# Patient Record
Sex: FEMALE | Race: WHITE | NOT HISPANIC OR LATINO | Employment: UNEMPLOYED | URBAN - METROPOLITAN AREA
[De-identification: names, ages, dates, MRNs, and addresses within clinical notes are randomized per-mention and may not be internally consistent; named-entity substitution may affect disease eponyms.]

---

## 2018-10-19 ASSESSMENT — MIFFLIN-ST. JEOR: SCORE: 1496.83

## 2018-10-22 ENCOUNTER — ANESTHESIA - HEALTHEAST (OUTPATIENT)
Dept: SURGERY | Facility: HOSPITAL | Age: 53
End: 2018-10-22

## 2018-10-22 ENCOUNTER — SURGERY - HEALTHEAST (OUTPATIENT)
Dept: SURGERY | Facility: HOSPITAL | Age: 53
End: 2018-10-22

## 2019-07-13 ENCOUNTER — OFFICE VISIT (OUTPATIENT)
Dept: URGENT CARE | Facility: URGENT CARE | Age: 54
End: 2019-07-13
Payer: COMMERCIAL

## 2019-07-13 VITALS
DIASTOLIC BLOOD PRESSURE: 79 MMHG | HEART RATE: 78 BPM | RESPIRATION RATE: 16 BRPM | BODY MASS INDEX: 32.78 KG/M2 | WEIGHT: 204 LBS | OXYGEN SATURATION: 98 % | TEMPERATURE: 97.8 F | SYSTOLIC BLOOD PRESSURE: 130 MMHG | HEIGHT: 66 IN

## 2019-07-13 DIAGNOSIS — H69.91 DYSFUNCTION OF RIGHT EUSTACHIAN TUBE: ICD-10-CM

## 2019-07-13 DIAGNOSIS — R42 DIZZINESS: Primary | ICD-10-CM

## 2019-07-13 PROCEDURE — 99214 OFFICE O/P EST MOD 30 MIN: CPT | Performed by: FAMILY MEDICINE

## 2019-07-13 RX ORDER — VORTIOXETINE 10 MG/1
TABLET, FILM COATED ORAL
Refills: 0 | COMMUNITY
Start: 2019-07-12

## 2019-07-13 RX ORDER — VENLAFAXINE HYDROCHLORIDE 75 MG/1
CAPSULE, EXTENDED RELEASE ORAL
COMMUNITY
Start: 2019-04-11

## 2019-07-13 ASSESSMENT — MIFFLIN-ST. JEOR: SCORE: 1543.12

## 2019-07-13 NOTE — PROGRESS NOTES
"Chief complaint: dizziness     For the past 2 months at 3pm will have nausea will have to lay down and drink meg feel awful.   Patient thinks it may be from her contact lens or glasses because it's usually when she's sewing in the afternoon    However in the last couple of weeks has had crackling in the ears, dry ears.     Has switched to glasses and seemed to help  However still get nauseous but not as bad    This morning went to wash her hair in the shower and felt dizzy that she needed to kneel down lasted a minute or two.   Chest pain or palpitation: No  Syncope or presyncope: No  Motor,sensory weakness, or slurring of speech: No  Headache: No  Blurring of vision : No   Abdominal pain: No  Recent trauma: No    Tried supportive treatment  At home no relief  Additional Information: above.     Problem list and histories reviewed & adjusted, as indicated.  Additional history: as documented    Problem list, Medication list, Allergies, and Medical/Social/Surgical histories reviewed in EPIC and updated as appropriate.    ROS:  Constitutional, HEENT, cardiovascular, pulmonary, gi and gu systems are negative, except as otherwise noted.    OBJECTIVE:                                                    /79   Pulse 78   Temp 97.8  F (36.6  C) (Oral)   Resp 16   Ht 1.67 m (5' 5.75\")   Wt 92.5 kg (204 lb)   SpO2 98%   BMI 33.18 kg/m    Body mass index is 33.18 kg/m .  GENERAL: healthy, alert and no distress  EYES: Eyes grossly normal to inspection, PERRL and conjunctivae and sclerae normal  HENT: ear canals and TM's normal, nose and mouth without ulcers or lesions  NECK: no adenopathy, no asymmetry, masses, or scars and thyroid normal to palpation  RESP: lungs clear to auscultation - no rales, rhonchi or wheezes  CV: regular rate and rhythm, normal S1 S2, no S3 or S4, no murmur, click or rub, no peripheral edema and peripheral pulses strong  ABDOMEN: soft, nontender, no hepatosplenomegaly, no masses and bowel " sounds normal  MS: no gross musculoskeletal defects noted, no edema  SKIN: no suspicious lesions or rashes  NEURO: Normal strength and tone, mentation intact and speech normal  PSYCH: mentation appears normal, affect normal/bright    Diagnostic Test Results:  none      ASSESSMENT/PLAN:                                                        ICD-10-CM    1. Dizziness R42    2. Dysfunction of right eustachian tube H69.81        Dizziness is likely multifactorial  2 month dizziness likely related to her eyewear  This morning possible BPPV  Ear and sinus exam normal   Patient has allergic rhinitis seasonal  Trial zyrtec and flonase     See patient instructions.  Signs and symptoms of worsening dizziness discussed. Alarm symptoms of possible CVA or cardiac events discussed. If with any of these advised to go to ER.    No driving and avoid being on any unprotected heights until dizziness is resolved.    Recommend follow up with primary care provider if no relief, sooner if worse  Adverse reactions of medications discussed.  Over the counter medications discussed.   Aware to come back in if with worsening symptoms or if no relief despite treatment plan  Patient voiced understanding and had no further questions.     MD Karla Joyner MD  [facility]

## 2019-07-13 NOTE — PATIENT INSTRUCTIONS
Patient Education     Dizziness (Vertigo) and Balance Problems: Staying Safe     Replace burned-out light bulbs to keep your home safe and well lit.     Falls or accidents can lead to pain, broken bones, and fear of future falls. Protect yourself and others by preparing for episodes. Simple steps can help you stay safe at home and wherever you go.  Lighting  Keep all areas well lit. This helps your eyes send the right signals to the brain. It also makes you less likely to trip and fall. If bright lights make symptoms worse, dim the lights or lie in a dark room until the dizziness passes. Then turn the lights back to their normal level.  Tips:    Keep a flashlight by the bed.    Place nightlights in bathrooms and hallways.    Replace burned-out bulbs, or have someone replace them for you.  Preventing falls  To reduce your risk of falling:    Get out of bed or up from a chair slowly.    Wear low-heeled shoes that fit properly and have slip-resistant soles.    Remove throw rugs. Clear clutter from walkways.    Use handrails on stairs. Have handrails installed or adjusted if needed.    Install grab bars in the bathroom. Don't use towel racks for balance.    Use a shower stool. Also put adhesive strips in the shower or on the tub floor.  Going out  With a little time and preparation, you can get around safely.  Tips:    Bring a cane or walking aid if needed.    Give yourself plenty of time in case you start to get dizzy.    Ask your healthcare provider what type of exercise is safe for your condition.    Be patient. If an activity such as walking through a crowded shop causes you stress, you may not be ready for it yet.  Driving  If you become dizzy or disoriented while driving, you could hurt yourself and others. That's why it's best to not drive until symptoms have gone away. In some cases, your license may be temporarily held until it's safe for you to drive again.  For safety:    Ask a friend to drive for  you.    Take public transportation.    Walk to stores and other places when you can.  Asking for help  Don't be afraid to ask for help running errands, cooking meals, and doing exercise. Whether it's a friend, loved one, neighbor, or stranger on the street, a little help can make a world of difference.   Date Last Reviewed: 11/1/2016 2000-2018 [employer]. [address] [place] [address], [place] [address]. All rights reserved. This information is not intended as a substitute for professional medical care. Always follow your healthcare professional's instructions.           Patient Education     Earache, No Infection (Adult)  Earaches can happen without an infection. This occurs when air and fluid build up behind the eardrum causing a feeling of fullness and discomfort and reduced hearing. This is called otitis media with effusion (OME) or serous otitis media. It means there is fluid in the middle ear. It is not the same as acute otitis media, which is typically from infection.  OME can happen when you have a cold if congestion blocks the passage that drains the middle ear. This passage is called the eustachian tube. OME may also occur with nasal allergies or after a bacterial middle ear infection.    The pain or discomfort may come and go. You may hear clicking or popping sounds when you chew or swallow. You may feel that your balance is off. Or you may hear ringing in the ear.  It often takes from several weeks up to 3 months for the fluid to clear on its own. Oral pain relievers and ear drops help if there is pain. Decongestants and antihistamines sometimes help. Antibiotics don't help since there is no infection. Your doctor may prescribe a nasal spray to help reduce swelling in the nose and eustachian tube. This can allow the ear to drain.  If your OME doesn't improve after 3 months, surgery may be used to drain the fluid and insert a small tube in the eardrum to allow continued drainage.  Because the  middle ear fluid can become infected, it is important to watch for signs of an ear infection which may develop later. These signs include increased ear pain, fever, or drainage from the ear.  Home care  The following guidelines will help you care for yourself at home:    You may use over-the-counter medicine as directed to control pain, unless another medicine was prescribed. If you have chronic liver or kidney disease or ever had a stomach ulcer or GI bleeding, talk with your doctor before using these medicines. Aspirin should never be used in anyone under 18 years of age who is ill with a fever. It may cause severe liver damage.    You may use over-the-counter decongestants such as phenylephrine or pseudoephedrine. But they are not always helpful. Don't use nasal spray decongestants more than 3 days. Longer use can make congestion worse. Prescription nasal sprays from your doctor don't typically have those restrictions.    Antihistamines may help if you are also having allergy symptoms.    You may use medicines such as guaifenesin to thin mucus and promote drainage.  Follow-up care  Follow up with your healthcare provider or as advised if you are not feeling better after 3 days.  When to seek medical advice  Call your healthcare provider right away if any of the following occur:    Your ear pain gets worse or does not start to improve     Fever of 100.4 F (38 C) or higher, or as directed by your healthcare provider    Fluid or blood draining from the ear    Headache or sinus pain    Stiff neck    Unusual drowsiness or confusion  Date Last Reviewed: 10/1/2016    5936-2513 [employer]. [address] [place] [address], [place] [address]. All rights reserved. This information is not intended as a substitute for professional medical care. Always follow your healthcare professional's instructions.

## 2019-08-17 ENCOUNTER — OFFICE VISIT (OUTPATIENT)
Dept: URGENT CARE | Facility: URGENT CARE | Age: 54
End: 2019-08-17
Payer: COMMERCIAL

## 2019-08-17 VITALS
TEMPERATURE: 97.8 F | WEIGHT: 201 LBS | SYSTOLIC BLOOD PRESSURE: 122 MMHG | BODY MASS INDEX: 32.69 KG/M2 | DIASTOLIC BLOOD PRESSURE: 79 MMHG | RESPIRATION RATE: 16 BRPM | OXYGEN SATURATION: 98 % | HEART RATE: 68 BPM

## 2019-08-17 DIAGNOSIS — J03.90 TONSILLITIS: Primary | ICD-10-CM

## 2019-08-17 DIAGNOSIS — Z20.818 EXPOSURE TO STREP THROAT: ICD-10-CM

## 2019-08-17 DIAGNOSIS — R07.0 THROAT PAIN: ICD-10-CM

## 2019-08-17 LAB
DEPRECATED S PYO AG THROAT QL EIA: NORMAL
SPECIMEN SOURCE: NORMAL

## 2019-08-17 PROCEDURE — 87880 STREP A ASSAY W/OPTIC: CPT | Performed by: PHYSICIAN ASSISTANT

## 2019-08-17 PROCEDURE — 99213 OFFICE O/P EST LOW 20 MIN: CPT | Performed by: PHYSICIAN ASSISTANT

## 2019-08-17 PROCEDURE — 87081 CULTURE SCREEN ONLY: CPT | Performed by: PHYSICIAN ASSISTANT

## 2019-08-17 RX ORDER — AMOXICILLIN 500 MG/1
500 CAPSULE ORAL 2 TIMES DAILY
Qty: 20 CAPSULE | Refills: 0 | Status: SHIPPED | OUTPATIENT
Start: 2019-08-17 | End: 2019-08-27

## 2019-08-17 ASSESSMENT — ENCOUNTER SYMPTOMS
COUGH: 1
FATIGUE: 0
CONSTITUTIONAL NEGATIVE: 1
FEVER: 0
CARDIOVASCULAR NEGATIVE: 1
CHILLS: 0
PALPITATIONS: 0
GASTROINTESTINAL NEGATIVE: 1
WHEEZING: 0
RHINORRHEA: 1
SORE THROAT: 1
SHORTNESS OF BREATH: 0
SINUS PRESSURE: 1

## 2019-08-17 NOTE — LETTER
August 18, 2019      Sharon L Behnke  [address] [facility] [address]        Dear Ms.Behnke,    We are writing to inform you of your test results.    Your test results fall within the expected range(s) or remain unchanged from previous results.  Please continue with current treatment plan.    Resulted Orders   Strep, Rapid Screen   Result Value Ref Range    Specimen Description Throat     Rapid Strep A Screen       NEGATIVE: No Group A streptococcal antigen detected by immunoassay, await culture report.   Beta strep group A culture   Result Value Ref Range    Specimen Description Throat     Culture Micro No beta hemolytic Streptococcus Group A isolated        If you have any questions or concerns, please call the clinic at the number listed above.       Sincerely,        Philly See NAHUM Khalil/HS

## 2019-08-17 NOTE — PROGRESS NOTES
"Subjective   Sharon L Behnke is a 54 year old female who presents to clinic today for the following health issues:  HPI   RESPIRATORY SYMPTOMS    Duration: 4-5days    Description  nasal congestion, rhinorrhea, sore throat and cough    Severity: mild    Accompanying signs and symptoms: Dry cough, shortness of breath or wheezing.  Mild sinus congestion/pressure but no pain or HA. No abdominal pain, n/v, constipation, diarrhea, bloody or black tarry stools.  No fever, chills or sweats.     History (predisposing factors):  Strep exposure at home.  No pmh of asthma.  Non-smoker.    Precipitating or alleviating factors: None    Therapies tried and outcome:  rest and fluids guaifenesin, lozenges with minimal relief      There is no problem list on file for this patient.    No past surgical history on file.    Social History     Tobacco Use     Smoking status: Never Smoker     Smokeless tobacco: Never Used     Tobacco comment: no 2nd hand smoke exposure   Substance Use Topics     Alcohol use: Yes     Comment: monthly     No family history on file.      Current Outpatient Medications   Medication Sig Dispense Refill     aspirin 81 MG tablet Take 81 mg by mouth daily       Cholecalciferol (VITAMIN D PO) Take  by mouth.       CLONIDINE HCL PO Take  by mouth.       CRESTOR 10 MG tablet        CYCLOBENZAPRINE HCL PO Take  by mouth.       ibuprofen (ADVIL,MOTRIN) 200 MG tablet Take 200 mg by mouth every 4 hours as needed.       Linaclotide (LINZESS PO) Take by mouth every morning (before breakfast)       TRINTELLIX 10 MG tablet   0     venlafaxine (EFFEXOR-XR) 75 MG 24 hr capsule        Allergies   Allergen Reactions     Codeine      Gets \"wired\"     Reviewed and updated as needed this visit by Provider       Review of Systems   Constitutional: Negative.  Negative for chills, fatigue and fever.   HENT: Positive for congestion, rhinorrhea, sinus pressure and sore throat. Negative for ear discharge, ear pain and hearing loss.  "   Respiratory: Positive for cough. Negative for shortness of breath and wheezing.    Cardiovascular: Negative.  Negative for chest pain, palpitations and peripheral edema.   Gastrointestinal: Negative.    Allergic/Immunologic: Negative for environmental allergies.   All other systems reviewed and are negative.           Objective    /79   Pulse 68   Temp 97.8  F (36.6  C) (Oral)   Resp 16   Wt 91.2 kg (201 lb)   SpO2 98%   BMI 32.69 kg/m    Body mass index is 32.69 kg/m .  Physical Exam   Constitutional: She is oriented to person, place, and time. She appears well-developed and well-nourished. No distress.   HENT:   Head: Normocephalic and atraumatic.   Nose: Nose normal.   Mouth/Throat: Uvula is midline and mucous membranes are normal. Posterior oropharyngeal erythema present. No oropharyngeal exudate, posterior oropharyngeal edema or tonsillar abscesses. Tonsils are 2+ on the right. Tonsils are 2+ on the left. No tonsillar exudate.   TMs are intact without any erythema or bulging bilaterally.  Airway is patent.   Eyes: Pupils are equal, round, and reactive to light. Conjunctivae and EOM are normal. No scleral icterus.   Neck: Normal range of motion. Neck supple.   Cardiovascular: Normal rate, regular rhythm, normal heart sounds and intact distal pulses. Exam reveals no gallop.   No murmur heard.  Pulmonary/Chest: Effort normal and breath sounds normal. No accessory muscle usage. No respiratory distress. She has no decreased breath sounds. She has no wheezes. She has no rhonchi. She has no rales. She exhibits no tenderness.   Lymphadenopathy:        Head (right side): Tonsillar adenopathy present.        Head (left side): Tonsillar adenopathy present.     She has no cervical adenopathy.   Neurological: She is alert and oriented to person, place, and time.   Skin: Skin is warm and dry.   Psychiatric: She has a normal mood and affect. Her behavior is normal. Judgment and thought content normal.   Nursing  note and vitals reviewed.  Diagnostic Test Results:  Labs reviewed in Epic  Results for orders placed or performed in visit on 08/17/19 (from the past 24 hour(s))   Strep, Rapid Screen   Result Value Ref Range    Specimen Description Throat     Rapid Strep A Screen       NEGATIVE: No Group A streptococcal antigen detected by immunoassay, await culture report.           Assessment & Plan   Tonsillitis:  RST is negative, will send for throat culture.  Will treat for tonsillitis with [id number] based on H&P.  Recommend tylenol/ibuprofen prn pain/fever, warm salt water gargles, lozenges or cough drops.  Recheck in clinic if symptoms worsen or if symptoms do not improve.    -     amoxicillin (AMOXIL) 500 MG capsule; Take 1 capsule (500 mg) by mouth 2 times daily for 10 days    Throat pain  -     Strep, Rapid Screen  -     Beta strep group A culture    Exposure to strep throat           Philly See NAHUM Khalil  [facility]

## 2019-08-18 LAB
BACTERIA SPEC CULT: NORMAL
SPECIMEN SOURCE: NORMAL

## 2019-11-12 ENCOUNTER — HOSPITAL ENCOUNTER (OUTPATIENT)
Dept: CT IMAGING | Facility: HOSPITAL | Age: 54
Discharge: HOME | End: 2019-11-12
Attending: FAMILY MEDICINE

## 2019-11-12 ENCOUNTER — RECORDS - HEALTHEAST (OUTPATIENT)
Dept: ADMINISTRATIVE | Facility: OTHER | Age: 54
End: 2019-11-12

## 2019-11-12 DIAGNOSIS — R10.32 LLQ PAIN: ICD-10-CM

## 2019-11-12 DIAGNOSIS — K92.1 MELENA: ICD-10-CM

## 2019-11-12 DIAGNOSIS — R11.10 VOMITING: ICD-10-CM

## 2020-01-03 ENCOUNTER — ANCILLARY PROCEDURE (OUTPATIENT)
Dept: GENERAL RADIOLOGY | Facility: CLINIC | Age: 55
End: 2020-01-03
Attending: FAMILY MEDICINE
Payer: COMMERCIAL

## 2020-01-03 ENCOUNTER — OFFICE VISIT (OUTPATIENT)
Dept: ORTHOPEDICS | Facility: CLINIC | Age: 55
End: 2020-01-03
Payer: COMMERCIAL

## 2020-01-03 VITALS
HEIGHT: 66 IN | DIASTOLIC BLOOD PRESSURE: 69 MMHG | BODY MASS INDEX: 30.7 KG/M2 | WEIGHT: 191 LBS | SYSTOLIC BLOOD PRESSURE: 105 MMHG

## 2020-01-03 DIAGNOSIS — M25.572 LEFT ANKLE PAIN: ICD-10-CM

## 2020-01-03 DIAGNOSIS — M25.572 ACUTE LEFT ANKLE PAIN: Primary | ICD-10-CM

## 2020-01-03 PROCEDURE — 99204 OFFICE O/P NEW MOD 45 MIN: CPT | Performed by: FAMILY MEDICINE

## 2020-01-03 PROCEDURE — 73610 X-RAY EXAM OF ANKLE: CPT | Mod: LT

## 2020-01-03 ASSESSMENT — MIFFLIN-ST. JEOR: SCORE: 1484.15

## 2020-01-03 NOTE — PROGRESS NOTES
"Sharon L Behnke  :  1965  DOS: 1/3/2020  MRN: [id number]    Sports Medicine Clinic Visit    PCP: [facility]    Sharon L Behnke is a 54 year old female who is seen as an AIC patient presenting with left ankle pain    Injury: 1 week(s), no known DM.  Pain located over left lateral to anterior ankle pain, joint line, radiating to toes.  Additional Features:  Positive: instability, clicking,  numbness and tingling.  Symptoms are better with Ibuprofen.  Symptoms are worse with: walking, twisting, WB, wearing her high heel boots.  Other evaluation and/or treatments so far consists of: Tylenol, ace wrap, supportive shoes and taping.  Prior imaging: No recent imaging completed.  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    Past Medical History:   Diagnosis Date     Breast cancer (H)      Past Surgical History:   Procedure Laterality Date     LUMPECTOMY BREAST       MASTECTOMY       ORTHOPEDIC SURGERY Right     Left wrist surgery - plate and screws     Family History   Problem Relation Age of Onset     Bone Cancer Father        Objective  /69 (BP Location: Left arm, Patient Position: Chair, Cuff Size: Adult Regular)   Ht 1.67 m (5' 5.75\")   Wt 86.6 kg (191 lb)   BMI 31.06 kg/m        General: healthy, alert and in no distress      HEENT: no scleral icterus or conjunctival erythema     Skin: no suspicious lesions or rash. No jaundice.     CV: regular rhythm by palpation, 2+ distal pulses, no pedal edema      Resp: normal respiratory effort without conversational dyspnea     Psych: normal mood and affect      Gait: mildly antalgic, appropriate coordination and balance     Neuro: " normal light touch sensory exam of the extremities. Motor strength as noted below     Left Ankle/Foot Exam:    Inspection:       no visible ecchymosis       no visible edema or effusion       Normal DP artery pulse       Normal PT artery pulse    Foot inspection:       no deformity noted, pes cavus    ROM:        full ROM with dorsiflexion, plantarflexion, inversion and eversion    Tender:       lateral malleolus       lateral ankle ligaments       distal tibiofibular joint    Non-Tender:       remainder of foot and ankle    Skin:       well perfused       capillary refill less than 2 seconds    Special Tests:       neg (-) anterior drawer        neg (-) talar tilt        neg (-) external rotation testing     Gait:       antalgic gait    Proprioception:        deferred      Radiology:  Recent Results (from the past 744 hour(s))   XR Ankle Left G/E 3 Views    Narrative    LEFT ANKLE THREE OR MORE VIEWS  1/3/2020 11:57 AM     HISTORY: Left ankle pain.      Impression    IMPRESSION: No apparent fracture or osseous destruction. The ankle  mortise appears congruent. Distal leg anterior soft tissue  nonspecific, likely dystrophic calcification. Calcaneal spurs without  evidence of retrocalcaneal bursitis or calcaneal erosion.    HUSEYIN NUNEZ MD         Assessment:  1. Acute left ankle pain        Plan:  Discussed the assessment with the patient.  Follow up: 1-2 weeks prn based on progress  Trial of ankle brace, provided significant comfort when fitted  Consider walking boot for short period if sx worsening, but hope to avoid  WBAT, assistive device options reviewed  XR images independently visualized and reviewed with patient today in clinic  No clear acute pathology, reviewed scattered chronic findings  No mechanism of injury, so hopeful this will be self-limited  Consider advanced imaging for labile or worsening sx  PT options available, low impact exercise options reviewed  We discussed modified progressive pain-free  activity as tolerated  Home handouts provided and supportive care reviewed  All questions were answered today  Contact us with additional questions or concerns  Signs and sx of concern reviewed      Hayden Dillon DO, KELLY  Primary Care Sports Medicine  [facility]

## 2020-01-03 NOTE — LETTER
"    1/3/2020         RE: Sharon L Behnke  [address] [facility] [address]        Dear Colleague,    Thank you for referring your patient, Sharon L Behnke, to the [facility]. Please see a copy of my visit note below.    Sharon L Behnke  :  1965  DOS: 1/3/2020  MRN: [id number]    Sports Medicine Clinic Visit    PCP: Clinic, [facility]    Sharon L Behnke is a 54 year old female who is seen as an AIC patient presenting with left ankle pain    Injury: 1 week(s), no known DM.  Pain located over left lateral to anterior ankle pain, joint line, radiating to toes.  Additional Features:  Positive: instability, clicking,  numbness and tingling.  Symptoms are better with Ibuprofen.  Symptoms are worse with: walking, twisting, WB, wearing her high heel boots.  Other evaluation and/or treatments so far consists of: Tylenol, ace wrap, supportive shoes and taping.  Prior imaging: No recent imaging completed.  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    Past Medical History:   Diagnosis Date     Breast cancer (H)      Past Surgical History:   Procedure Laterality Date     LUMPECTOMY BREAST       MASTECTOMY       ORTHOPEDIC SURGERY Right     Left wrist surgery - plate and screws     Family History   Problem Relation Age of Onset     Bone Cancer Father        Objective  /69 (BP Location: Left arm, Patient Position: Chair, Cuff Size: Adult Regular)   Ht 1.67 m (5' 5.75\")   Wt 86.6 kg (191 lb)   BMI 31.06 kg/m         General: healthy, alert and in no distress      HEENT: no scleral icterus or conjunctival erythema     Skin: no suspicious lesions or rash. No " jaundice.     CV: regular rhythm by palpation, 2+ distal pulses, no pedal edema      Resp: normal respiratory effort without conversational dyspnea     Psych: normal mood and affect      Gait: mildly antalgic, appropriate coordination and balance     Neuro: normal light touch sensory exam of the extremities. Motor strength as noted below     Left Ankle/Foot Exam:    Inspection:       no visible ecchymosis       no visible edema or effusion       Normal DP artery pulse       Normal PT artery pulse    Foot inspection:       no deformity noted, pes cavus    ROM:        full ROM with dorsiflexion, plantarflexion, inversion and eversion    Tender:       lateral malleolus       lateral ankle ligaments       distal tibiofibular joint    Non-Tender:       remainder of foot and ankle    Skin:       well perfused       capillary refill less than 2 seconds    Special Tests:       neg (-) anterior drawer        neg (-) talar tilt        neg (-) external rotation testing     Gait:       antalgic gait    Proprioception:        deferred      Radiology:  Recent Results (from the past 744 hour(s))   XR Ankle Left G/E 3 Views    Narrative    LEFT ANKLE THREE OR MORE VIEWS  1/3/2020 11:57 AM     HISTORY: Left ankle pain.      Impression    IMPRESSION: No apparent fracture or osseous destruction. The ankle  mortise appears congruent. Distal leg anterior soft tissue  nonspecific, likely dystrophic calcification. Calcaneal spurs without  evidence of retrocalcaneal bursitis or calcaneal erosion.    HUSEYIN NUNEZ MD         Assessment:  1. Acute left ankle pain        Plan:  Discussed the assessment with the patient.  Follow up: 1-2 weeks prn based on progress  Trial of ankle brace, provided significant comfort when fitted  Consider walking boot for short period if sx worsening, but hope to avoid  WBAT, assistive device options reviewed  XR images independently visualized and reviewed with patient today in clinic  No clear acute  pathology, reviewed scattered chronic findings  No mechanism of injury, so hopeful this will be self-limited  Consider advanced imaging for labile or worsening sx  PT options available, low impact exercise options reviewed  We discussed modified progressive pain-free activity as tolerated  Home handouts provided and supportive care reviewed  All questions were answered today  Contact us with additional questions or concerns  Signs and sx of concern reviewed      Hayden Dillon DO, KELLY  Primary Care Sports Medicine  [facility]                   Again, thank you for allowing me to participate in the care of your patient.        Sincerely,        Hayden Dillon DO

## 2020-09-08 ENCOUNTER — VIRTUAL VISIT (OUTPATIENT)
Dept: FAMILY MEDICINE | Facility: OTHER | Age: 55
End: 2020-09-08

## 2020-09-08 NOTE — PROGRESS NOTES
"Date: 2020 12:32:25  Clinician: Deon Wing  Clinician NPI: [id number]  Patient: Sharon Behnke  Patient : 1965  Patient Address: [address], [place] [address]  Patient Phone: [phone]  Visit Protocol: URI  Patient Summary:  Roberta is a 55 year old ( : 1965 ) female who initiated a Visit for COVID-19 (Coronavirus) evaluation and screening. When asked the question \"Please sign me up to receive news, health information and promotions. \", Roberta responded \"No\".    Roberta states her symptoms started gradually 5-6 days ago.   Her symptoms consist of a sore throat, ageusia, chills, malaise, and myalgia. She is experiencing mild difficulty breathing with activities but can speak normally in full sentences.   Symptom details   Sore throat: Roberta reports having mild throat pain (1-3 on a 10 point pain scale), does not have exudate on her tonsils, and can swallow liquids. She is not sure if the lymph nodes in her neck are enlarged. A rash has not appeared on the skin since the sore throat started.    Roberta denies having ear pain, anosmia, facial pain or pressure, fever, vomiting, rhinitis, nausea, wheezing, teeth pain, diarrhea, cough, nasal congestion, and headache. She also denies taking antibiotic medication in the past month, having recent facial or sinus surgery in the past 60 days, and double sickening (worsening symptoms after initial improvement).   Precipitating events  Within the past week, Roberta has not been exposed to someone with strep throat. She has not recently been exposed to someone with influenza. Roberta has been in close contact with the following high risk individuals: adults 65 or older and people with asthma, heart disease or diabetes.   Pertinent COVID-19 (Coronavirus) information  In the past 14 days, Roberta has not worked in a congregate living setting.   She does not work or volunteer as healthcare worker or a  and does not work or " volunteer in a healthcare facility.   Roberta also has not lived in a congregate living setting in the past 14 days. She does not live with a healthcare worker.   Roberta has had a close contact with a laboratory-confirmed COVID-19 patient within 14 days of symptom onset.   Since December 2019, Roberta and has had upper respiratory infection (URI) or influenza-like illness. Has not been diagnosed with lab-confirmed COVID-19 test      Date(s) of previous URI or influenza-like illness (free-text): A month ago I got laryngitis severely for 2 days and it hung on for about 2 weeks. Then we were expo     Symptoms Roberta experienced during previous URI or influenza-like illness as reported by the patient (free-text): I feel faint last week weak in the knees having to lie down.        Pertinent medical history  Roberta does not get yeast infections when she takes antibiotics.   Roberta does not need a return to work/school note.   Weight: 195 lbs   Roberta does not smoke or use smokeless tobacco.   Weight: 195 lbs    MEDICATIONS: Trintellix oral, Crestor oral, trazodone oral, Linzess oral, aspirin oral, ALLERGIES: NKDA  Clinician Response:  Dear Roberta,   Your symptoms show that you may have coronavirus (COVID-19). This illness can cause fever, cough and trouble breathing. Many people get a mild case and get better on their own. Some people can get very sick.  What should I do?  We would like to test you for this virus.   1. Please call [phone] to schedule your visit. Explain that you were referred by [facility] to have a COVID-19 test. Be ready to share your [facility] visit ID number.  The following will serve as your written order for this COVID Test, ordered by me, for the indication of suspected COVID [Z20.828]: The test will be ordered in [employer], our electronic health record, after you are scheduled. It will show as ordered and authorized by Jaylon Gray MD.  Order: COVID-19 (Coronavirus) PCR for SYMPTOMATIC testing from [facility].   "    2. When it's time for your COVID test:  Stay at least 6 feet away from others. (If someone will drive you to your test, stay in the backseat, as far away from the  as you can.)   Cover your mouth and nose with a mask, tissue or washcloth.  Go straight to the testing site. Don't make any stops on the way there or back.      3.Starting now: Stay home and away from others (self-isolate) until:   You've had no fever---and no medicine that reduces fever---for one full day (24 hours). And...   Your other symptoms have gotten better. For example, your cough or breathing has improved. And...   At least 10 days have passed since your symptoms started.       During this time, don't leave the house except for testing or medical care.   Stay in your own room, even for meals. Use your own bathroom if you can.   Stay away from others in your home. No hugging, kissing or shaking hands. No visitors.  Don't go to work, school or anywhere else.    Clean \"high touch\" surfaces often (doorknobs, counters, handles, etc.). Use a household cleaning spray or wipes. You'll find a full list of  on the EPA website: www.epa.gov/pesticide-registration/list-n-disinfectants-use-against-sars-cov-2.   Cover your mouth and nose with a mask, tissue or washcloth to avoid spreading germs.  Wash your hands and face often. Use soap and water.  Caregivers in these groups are at risk for severe illness due to COVID-19:  o People 65 years and older  o People who live in a nursing home or long-term care facility  o People with chronic disease (lung, heart, cancer, diabetes, kidney, liver, immunologic)  o People who have a weakened immune system, including those who:   Are in cancer treatment  Take medicine that weakens the immune system, such as corticosteroids  Had a bone marrow or organ transplant  Have an immune deficiency  Have poorly controlled HIV or AIDS  Are obese (body mass index of 40 or higher)  Smoke regularly   o Caregivers " should wear gloves while washing dishes, handling laundry and cleaning bedrooms and bathrooms.  o Use caution when washing and drying laundry: Don't shake dirty laundry, and use the warmest water setting that you can.  o For more tips, go to www.cdc.gov/coronavirus/2019-ncov/downloads/10Things.pdf.       How can I take care of myself?   Get lots of rest. Drink extra fluids (unless a doctor has told you not to).   Take Tylenol (acetaminophen) for fever or pain. If you have liver or kidney problems, ask your family doctor if it's okay to take Tylenol.   Adults can take either:    650 mg (two 325 mg pills) every 4 to 6 hours, or...   1,000 mg (two 500 mg pills) every 8 hours as needed.    Note: Don't take more than 3,000 mg in one day. Acetaminophen is found in many medicines (both prescribed and over-the-counter medicines). Read all labels to be sure you don't take too much.   For children, check the Tylenol bottle for the right dose. The dose is based on the child's age or weight.    If you have other health problems (like cancer, heart failure, an organ transplant or severe kidney disease): Call your specialty clinic if you don't feel better in the next 2 days.       Know when to call 911. Emergency warning signs include:    Trouble breathing or shortness of breath Pain or pressure in the chest that doesn't go away Feeling confused like you haven't felt before, or not being able to wake up Bluish-colored lips or face.  Where can I get more information?   [facility] -- About COVID-19: [email or web address]   CDC -- What to Do If You're Sick: www.cdc.gov/coronavirus/2019-ncov/about/steps-when-sick.html   CDC -- Ending Home Isolation: www.cdc.gov/coronavirus/2019-ncov/hcp/disposition-in-home-patients.html   CDC -- Caring for Someone: www.cdc.gov/coronavirus/2019-ncov/if-you-are-sick/care-for-someone.html   [facility] -- Interim Guidance for Hospital Discharge to Home:  www.health.[facility].[place]/diseases/coronavirus/hcp/hospdischarge.pdf   [facility] clinical trials (COVID-19 research studies): clinicalaffairs.[facility]/umn-clinical-trials    Below are the COVID-19 hotlines at the [place] Department of Health ([facility]). Interpreters are available.    For health questions: Call 333-786-2835 or 1-793.306.2909 (7 a.m. to 7 p.m.) For questions about schools and childcare: Call 965-601-7688 or 1-767.239.8759 (7 a.m. to 7 p.m.)    Diagnosis: Other malaise  Diagnosis ICD: R53.81

## 2020-09-14 ENCOUNTER — AMBULATORY - HEALTHEAST (OUTPATIENT)
Dept: INTERNAL MEDICINE | Facility: CLINIC | Age: 55
End: 2020-09-14

## 2020-09-14 DIAGNOSIS — Z20.822 SUSPECTED 2019 NOVEL CORONAVIRUS INFECTION: ICD-10-CM

## 2020-09-15 ENCOUNTER — AMBULATORY - HEALTHEAST (OUTPATIENT)
Dept: FAMILY MEDICINE | Facility: CLINIC | Age: 55
End: 2020-09-15

## 2020-09-15 DIAGNOSIS — Z20.822 SUSPECTED 2019 NOVEL CORONAVIRUS INFECTION: ICD-10-CM

## 2020-09-17 ENCOUNTER — COMMUNICATION - HEALTHEAST (OUTPATIENT)
Dept: SCHEDULING | Facility: CLINIC | Age: 55
End: 2020-09-17

## 2020-11-02 ENCOUNTER — OFFICE VISIT (OUTPATIENT)
Dept: ORTHOPEDICS | Facility: CLINIC | Age: 55
End: 2020-11-02
Payer: COMMERCIAL

## 2020-11-02 VITALS
BODY MASS INDEX: 31.34 KG/M2 | WEIGHT: 195 LBS | HEIGHT: 66 IN | SYSTOLIC BLOOD PRESSURE: 116 MMHG | DIASTOLIC BLOOD PRESSURE: 78 MMHG

## 2020-11-02 DIAGNOSIS — S56.911A FOREARM STRAIN, RIGHT, INITIAL ENCOUNTER: Primary | ICD-10-CM

## 2020-11-02 PROCEDURE — 99213 OFFICE O/P EST LOW 20 MIN: CPT | Performed by: PEDIATRICS

## 2020-11-02 ASSESSMENT — MIFFLIN-ST. JEOR: SCORE: 1501.26

## 2020-11-02 NOTE — PROGRESS NOTES
Sports Medicine Clinic Visit    PCP: Clinic, [facility]    Sharon L Behnke is a 55 year old female who is seen  as a self referral presenting with right elbow pain after using a screw  on her sewing machine on 10/24/20.  Had pain that traveled from the lateral aspect of her elbow to her hand.  Does now have some medial elbow pain as well.    Does have some numbness and tingling in her right hand, worse in the middle finger.   HX of trigger finger as well  She is right hand dominant.    Has chronic right shoulder pain and numbness as well, states she has been treating this with PT and medication for years.      Injury: overuse   **  Was removing a tight screw, with using screwdriver noted shooting pain dorsal forearm to long finger; now has also been noting medial elbow pain as well.  No dorsal pain at rest, but does have some medial pain at rest.  Has noted some tightness in right hand, does have hx trigger finger as well; no noise at elbow.      Location of Pain: right elbow   Duration of Pain: 10 day(s)  Rating of Pain at worst: 9/10  Rating of Pain Currently: 2/10  Symptoms are better with: Rest  Symptoms are worse with: use  Additional Features:   Positive: paresthesias and numbness   Negative: swelling, bruising, popping, grinding, catching, locking, instability, pain in other joints and systemic symptoms  Other evaluation and/or treatments so far consists of:   Prior History of related problems: denies for elbow, HX of trigger finger, shoulder and neck pain.     Social History: tailor     Review of Systems  Musculoskeletal: as above  Remainder of review of systems is negative including constitutional, CV, pulmonary, GI, Skin and Neurologic except as noted in HPI or medical history.    Past Medical History:   Diagnosis Date     Breast cancer (H)      Past Surgical History:   Procedure Laterality Date     LUMPECTOMY BREAST       MASTECTOMY       ORTHOPEDIC SURGERY Right     Left wrist surgery - plate  "and screws     Family History   Problem Relation Age of Onset     Bone Cancer Father      Social History     Socioeconomic History     Marital status:      Spouse name: Not on file     Number of children: Not on file     Years of education: Not on file     Highest education level: Not on file   Occupational History     Not on file   Social Needs     Financial resource strain: Not on file     Food insecurity     Worry: Not on file     Inability: Not on file     Transportation needs     Medical: Not on file     Non-medical: Not on file   Tobacco Use     Smoking status: Never Smoker     Smokeless tobacco: Never Used     Tobacco comment: no 2nd hand smoke exposure   Substance and Sexual Activity     Alcohol use: Yes     Comment: monthly     Drug use: No     Sexual activity: Yes     Partners: Male   Lifestyle     Physical activity     Days per week: Not on file     Minutes per session: Not on file     Stress: Not on file   Relationships     Social connections     Talks on phone: Not on file     Gets together: Not on file     Attends Muslim service: Not on file     Active member of club or organization: Not on file     Attends meetings of clubs or organizations: Not on file     Relationship status: Not on file     Intimate partner violence     Fear of current or ex partner: Not on file     Emotionally abused: Not on file     Physically abused: Not on file     Forced sexual activity: Not on file   Other Topics Concern     Parent/sibling w/ CABG, MI or angioplasty before 65F 55M? Not Asked   Social History Narrative     Not on file       Objective  /78   Ht 1.676 m (5' 6\")   Wt 88.5 kg (195 lb)   BMI 31.47 kg/m      GENERAL APPEARANCE: healthy, alert and no distress   GAIT: NORMAL  SKIN: no suspicious lesions or rashes  NEURO: Normal strength and tone, mentation intact and speech normal  PSYCH:  mentation appears normal and affect normal/bright  HEENT: no scleral icterus  CV: distal perfusion " intact  RESP: nonlabored breathing      Right Elbow exam:    Inspection:       no ecchymosis       no edema or effusion    ROM:       full with flexion, extension, forearm supination and pronation.    Strength:       Some dorsal forearm discomfort with resisted wrist extension, resisted forearm rotation  Minimal change with resisted wrist flexion, long finger extension    Tender:       Dorsal forearm, extensor wad proximally    Non-Tender:       remainder of elbow area       medial epicondyle    Sensation:  Grossly intact     Skin:       well perfused       capillary refill brisk        Radiology:  None today.    Assessment:  1. Forearm strain, right, initial encounter        Plan:  Discussed the assessment with the patient.  We discussed the following: symptom treatment, activity modification/rest, imaging, rehab, potential for improvement with time and support for the affected area. Following discussion, plan:    Topical Treatments: Ice, Heat or Topical Analgesics prn  Over the counter medication: prn  No imaging of the area today, given current function. May reconsider pending course.  Activity Modification: reviewed  Rehab: Home Exercise Program reviewed today  Physical Therapy: offered; contact clinic if interested in formal therapy referral for this.  Medical Equipment: discussed use of forearm strap, wrist brace for comfort with activities. Use reviewed today. She has devices already, may use those if desired.  Follow up: start with monitoring 2 weeks with HEP. Likely formal therapy next if persistent issues.  Questions answered. Discussed signs and symptoms that may indicate more serious issues; the patient was instructed to seek appropriate care if noted. Roberta indicates understanding of these issues and agrees with the plan.        Hayden Damon DO, CAQ          Patient Instructions   Begin with home exercise program, given today  Use of forearm strap and/or wrist brace if comfortable with  activities  Activity modification reviewed  Call if interested in physical therapy            This note consists of symbols derived from keyboarding, dictation and/or voice recognition software. As a result, there may be errors in the script that have gone undetected. Please consider this when interpreting information found in this chart.

## 2020-11-02 NOTE — LETTER
11/2/2020         RE: Sharon L Behnke  [address] [id number]        Dear Colleague,    Thank you for referring your patient, Sharon L Behnke, to the [facility]. Please see a copy of my visit note below.    Sports Medicine Clinic Visit    PCP: Clinic, [facility]    Sharon L Behnke is a 55 year old female who is seen  as a self referral presenting with right elbow pain after using a screw  on her sewing machine on 10/24/20.  Had pain that traveled from the lateral aspect of her elbow to her hand.  Does now have some medial elbow pain as well.    Does have some numbness and tingling in her right hand, worse in the middle finger.   HX of trigger finger as well  She is right hand dominant.    Has chronic right shoulder pain and numbness as well, states she has been treating this with PT and medication for years.      Injury: overuse   **  Was removing a tight screw, with using screwdriver noted shooting pain dorsal forearm to long finger; now has also been noting medial elbow pain as well.  No dorsal pain at rest, but does have some medial pain at rest.  Has noted some tightness in right hand, does have hx trigger finger as well; no noise at elbow.      Location of Pain: right elbow   Duration of Pain: 10 day(s)  Rating of Pain at worst: 9/10  Rating of Pain Currently: 2/10  Symptoms are better with: Rest  Symptoms are worse with: use  Additional Features:   Positive: paresthesias and numbness   Negative: swelling, bruising, popping, grinding, catching, locking, instability, pain in other joints and systemic symptoms  Other evaluation and/or treatments so far consists of:   Prior History of related problems: denies for elbow, HX of trigger finger, shoulder and neck pain.     Social History: tailor     Review of Systems  Musculoskeletal: as above  Remainder of review of systems is negative including constitutional, CV, pulmonary, GI, Skin and  "Neurologic except as noted in HPI or medical history.    Past Medical History:   Diagnosis Date     Breast cancer (H)      Past Surgical History:   Procedure Laterality Date     LUMPECTOMY BREAST       MASTECTOMY       ORTHOPEDIC SURGERY Right     Left wrist surgery - plate and screws     Family History   Problem Relation Age of Onset     Bone Cancer Father      Social History     Socioeconomic History     Marital status:      Spouse name: Not on file     Number of children: Not on file     Years of education: Not on file     Highest education level: Not on file   Occupational History     Not on file   Social Needs     Financial resource strain: Not on file     Food insecurity     Worry: Not on file     Inability: Not on file     Transportation needs     Medical: Not on file     Non-medical: Not on file   Tobacco Use     Smoking status: Never Smoker     Smokeless tobacco: Never Used     Tobacco comment: no 2nd hand smoke exposure   Substance and Sexual Activity     Alcohol use: Yes     Comment: monthly     Drug use: No     Sexual activity: Yes     Partners: Male   Lifestyle     Physical activity     Days per week: Not on file     Minutes per session: Not on file     Stress: Not on file   Relationships     Social connections     Talks on phone: Not on file     Gets together: Not on file     Attends Pentecostalism service: Not on file     Active member of club or organization: Not on file     Attends meetings of clubs or organizations: Not on file     Relationship status: Not on file     Intimate partner violence     Fear of current or ex partner: Not on file     Emotionally abused: Not on file     Physically abused: Not on file     Forced sexual activity: Not on file   Other Topics Concern     Parent/sibling w/ CABG, MI or angioplasty before 65F 55M? Not Asked   Social History Narrative     Not on file       Objective  /78   Ht 1.676 m (5' 6\")   Wt 88.5 kg (195 lb)   BMI 31.47 kg/m      GENERAL APPEARANCE: " healthy, alert and no distress   GAIT: NORMAL  SKIN: no suspicious lesions or rashes  NEURO: Normal strength and tone, mentation intact and speech normal  PSYCH:  mentation appears normal and affect normal/bright  HEENT: no scleral icterus  CV: distal perfusion intact  RESP: nonlabored breathing      Right Elbow exam:    Inspection:       no ecchymosis       no edema or effusion    ROM:       full with flexion, extension, forearm supination and pronation.    Strength:       Some dorsal forearm discomfort with resisted wrist extension, resisted forearm rotation  Minimal change with resisted wrist flexion, long finger extension    Tender:       Dorsal forearm, extensor wad proximally    Non-Tender:       remainder of elbow area       medial epicondyle    Sensation:  Grossly intact     Skin:       well perfused       capillary refill brisk        Radiology:  None today.    Assessment:  1. Forearm strain, right, initial encounter        Plan:  Discussed the assessment with the patient.  We discussed the following: symptom treatment, activity modification/rest, imaging, rehab, potential for improvement with time and support for the affected area. Following discussion, plan:    Topical Treatments: Ice, Heat or Topical Analgesics prn  Over the counter medication: prn  No imaging of the area today, given current function. May reconsider pending course.  Activity Modification: reviewed  Rehab: Home Exercise Program reviewed today  Physical Therapy: offered; contact clinic if interested in formal therapy referral for this.  Medical Equipment: discussed use of forearm strap, wrist brace for comfort with activities. Use reviewed today. She has devices already, may use those if desired.  Follow up: start with monitoring 2 weeks with HEP. Likely formal therapy next if persistent issues.  Questions answered. Discussed signs and symptoms that may indicate more serious issues; the patient was instructed to seek appropriate care if  noted. Roberta indicates understanding of these issues and agrees with the plan.        Hayden Damon DO, KELLY          Patient Instructions   Begin with home exercise program, given today  Use of forearm strap and/or wrist brace if comfortable with activities  Activity modification reviewed  Call if interested in physical therapy            This note consists of symbols derived from keyboarding, dictation and/or voice recognition software. As a result, there may be errors in the script that have gone undetected. Please consider this when interpreting information found in this chart.          Again, thank you for allowing me to participate in the care of your patient.        Sincerely,        Hayden Damon DO

## 2020-11-02 NOTE — PATIENT INSTRUCTIONS
Begin with home exercise program, given today  Use of forearm strap and/or wrist brace if comfortable with activities  Activity modification reviewed  Call if interested in physical therapy

## 2020-11-09 ENCOUNTER — TELEPHONE (OUTPATIENT)
Dept: ORTHOPEDICS | Facility: CLINIC | Age: 55
End: 2020-11-09

## 2020-11-09 DIAGNOSIS — S56.911A FOREARM STRAIN, RIGHT, INITIAL ENCOUNTER: Primary | ICD-10-CM

## 2020-11-09 NOTE — TELEPHONE ENCOUNTER
[facility]  [id number] [place] [address].  [place]  [address]   # [phone]  Fax # [phone]      Referral faxed  Shayy Hunt MS ATC

## 2020-11-09 NOTE — TELEPHONE ENCOUNTER
----- Message from Ethel Recinos sent at 11/9/2020  9:29 AM CST -----  Regarding: patient request/message  Contact: [phone]  Hi- patient would like a call back to see if she can get at PT order from Dr. Damon.  [phone]

## 2020-11-09 NOTE — TELEPHONE ENCOUNTER
Physical therapy order was placed per chart review of last visit. She request an external referral for [facility]. She will call back with a fax number to send the referral too. Please fax when number is provided.    Wily Bliss ATC, LAT

## 2020-11-11 ENCOUNTER — TRANSFERRED RECORDS (OUTPATIENT)
Dept: HEALTH INFORMATION MANAGEMENT | Facility: CLINIC | Age: 55
End: 2020-11-11

## 2020-11-16 ENCOUNTER — TRANSFERRED RECORDS (OUTPATIENT)
Dept: HEALTH INFORMATION MANAGEMENT | Facility: CLINIC | Age: 55
End: 2020-11-16

## 2020-11-25 ENCOUNTER — TRANSFERRED RECORDS (OUTPATIENT)
Dept: HEALTH INFORMATION MANAGEMENT | Facility: CLINIC | Age: 55
End: 2020-11-25

## 2020-11-25 ENCOUNTER — TELEPHONE (OUTPATIENT)
Dept: ORTHOPEDICS | Facility: CLINIC | Age: 55
End: 2020-11-25

## 2020-11-25 NOTE — TELEPHONE ENCOUNTER
Reason for call:  Other   Patient called regarding (reason for call): follow up   Additional comments: Thank you Doctor Marisol for the referral and if you have any additional recommendations for the patient's progress please give me a call.     Phone number to reach patient:  Other phone number:  [phone]    Best Time:  Anytime     Can we leave a detailed message on this number?  YES    Travel screening: Not Applicable

## 2020-12-21 ENCOUNTER — TRANSFERRED RECORDS (OUTPATIENT)
Dept: HEALTH INFORMATION MANAGEMENT | Facility: CLINIC | Age: 55
End: 2020-12-21

## 2021-01-12 ENCOUNTER — TRANSFERRED RECORDS (OUTPATIENT)
Dept: HEALTH INFORMATION MANAGEMENT | Facility: CLINIC | Age: 56
End: 2021-01-12

## 2021-02-12 ENCOUNTER — TRANSFERRED RECORDS (OUTPATIENT)
Dept: HEALTH INFORMATION MANAGEMENT | Facility: CLINIC | Age: 56
End: 2021-02-12

## 2021-06-02 VITALS — HEIGHT: 66 IN | BODY MASS INDEX: 31.5 KG/M2 | WEIGHT: 196 LBS

## 2021-06-16 PROBLEM — C50.919 BREAST CANCER (H): Status: ACTIVE | Noted: 2019-11-12

## 2021-06-21 NOTE — ANESTHESIA POSTPROCEDURE EVALUATION
Patient: Sharon L Behnke  HYSTEROSCOPY,  DILATION AND CURETTAGE  Anesthesia type: general    Patient location: PACU  Last vitals:   Vitals:    10/22/18 1230   BP: 118/60   Pulse: 68   Resp: 19   Temp:    SpO2: 100%     Post vital signs: stable  Level of consciousness: awake and responds to simple questions  Post-anesthesia pain: pain controlled  Post-anesthesia nausea and vomiting: no  Pulmonary: unassisted, return to baseline  Cardiovascular: stable and blood pressure at baseline  Hydration: adequate  Anesthetic events: no    QCDR Measures:  ASA# 11 - Aster-op Cardiac Arrest: ASA11B - Patient did NOT experience unanticipated cardiac arrest  ASA# 12 - Aster-op Mortality Rate: ASA12B - Patient did NOT die  ASA# 13 - PACU Re-Intubation Rate: ASA13B - Patient did NOT require a new airway mgmt  ASA# 10 - Composite Anes Safety: ASA10A - No serious adverse event    Additional Notes:

## 2021-06-21 NOTE — ANESTHESIA PREPROCEDURE EVALUATION
Anesthesia Evaluation      Patient summary reviewed   No history of anesthetic complications     Airway   Mallampati: II  Neck ROM: full   Pulmonary - normal exam   (-) not a smoker                         Cardiovascular   Exercise tolerance: > or = 4 METS  Rhythm: regular  Rate: normal,         Neuro/Psych      Endo/Other    (+) obesity (BMI 31.88),      GI/Hepatic/Renal       Other findings: History of BCA Obese habitus      Dental - normal exam                        Anesthesia Plan  Planned anesthetic: general LMA and total IV anesthesia  Patient anxious, propofol infusion please    Addedum: surgeon requesting GA.  Will amend to GA LMA  ASA 2   Induction: intravenous   Anesthetic plan and risks discussed with: patient and spouse  Anesthesia plan special considerations: antiemetics,   Post-op plan: routine recovery

## 2021-06-21 NOTE — ANESTHESIA CARE TRANSFER NOTE
Last vitals:   Vitals:    10/22/18 1130   BP: 112/58   Pulse: 80   Resp: 18   Temp: 36.2  C (97.1  F)   SpO2: 100%     Patient's level of consciousness is drowsy  Spontaneous respirations: yes  Maintains airway independently: yes  Dentition unchanged: yes  Oropharynx: oropharynx clear of all foreign objects    QCDR Measures:  ASA# 20 - Surgical Safety Checklist: WHO surgical safety checklist completed prior to induction  PQRS# 430 - Adult PONV Prevention: 4558F - Pt received => 2 anti-emetic agents (different classes) preop & intraop  ASA# 8 - Peds PONV Prevention: NA - Not pediatric patient, not GA or 2 or more risk factors NOT present  PQRS# 424 - Aster-op Temp Management: 4559F - At least one body temp DOCUMENTED => 35.5C or 95.9F within required timeframe  PQRS# 426 - PACU Transfer Protocol: - Transfer of care checklist used  ASA# 14 - Acute Post-op Pain: ASA14B - Patient did NOT experience pain >= 7 out of 10

## 2021-08-07 ENCOUNTER — HEALTH MAINTENANCE LETTER (OUTPATIENT)
Age: 56
End: 2021-08-07

## 2021-10-02 ENCOUNTER — HEALTH MAINTENANCE LETTER (OUTPATIENT)
Age: 56
End: 2021-10-02

## 2022-08-28 ENCOUNTER — HEALTH MAINTENANCE LETTER (OUTPATIENT)
Age: 57
End: 2022-08-28

## 2023-01-14 ENCOUNTER — HEALTH MAINTENANCE LETTER (OUTPATIENT)
Age: 58
End: 2023-01-14

## 2023-02-13 ENCOUNTER — OFFICE VISIT (OUTPATIENT)
Dept: URGENT CARE | Facility: URGENT CARE | Age: 58
End: 2023-02-13
Payer: COMMERCIAL

## 2023-02-13 ENCOUNTER — TELEPHONE (OUTPATIENT)
Dept: NURSING | Facility: CLINIC | Age: 58
End: 2023-02-13
Payer: COMMERCIAL

## 2023-02-13 ENCOUNTER — ANCILLARY PROCEDURE (OUTPATIENT)
Dept: GENERAL RADIOLOGY | Facility: CLINIC | Age: 58
End: 2023-02-13
Attending: NURSE PRACTITIONER
Payer: COMMERCIAL

## 2023-02-13 VITALS
DIASTOLIC BLOOD PRESSURE: 74 MMHG | WEIGHT: 179 LBS | OXYGEN SATURATION: 96 % | SYSTOLIC BLOOD PRESSURE: 117 MMHG | BODY MASS INDEX: 28.89 KG/M2 | TEMPERATURE: 98.6 F | RESPIRATION RATE: 14 BRPM | HEART RATE: 75 BPM

## 2023-02-13 DIAGNOSIS — R05.1 ACUTE COUGH: Primary | ICD-10-CM

## 2023-02-13 DIAGNOSIS — R09.89 RHONCHI AT BOTH LUNG BASES: ICD-10-CM

## 2023-02-13 DIAGNOSIS — R06.02 SOB (SHORTNESS OF BREATH): ICD-10-CM

## 2023-02-13 PROCEDURE — 99204 OFFICE O/P NEW MOD 45 MIN: CPT | Performed by: NURSE PRACTITIONER

## 2023-02-13 PROCEDURE — 71046 X-RAY EXAM CHEST 2 VIEWS: CPT | Mod: TC | Performed by: RADIOLOGY

## 2023-02-13 RX ORDER — AMOXICILLIN 875 MG
1 TABLET ORAL DAILY
COMMUNITY
Start: 2023-02-11 | End: 2024-01-27

## 2023-02-13 RX ORDER — PREGABALIN 75 MG/1
1 CAPSULE ORAL DAILY
COMMUNITY
Start: 2022-12-10

## 2023-02-13 RX ORDER — TRAZODONE HYDROCHLORIDE 50 MG/1
1 TABLET, FILM COATED ORAL DAILY
COMMUNITY
Start: 2023-01-26

## 2023-02-13 NOTE — PROGRESS NOTES
Assessment & Plan     1. Acute cough    - XR Chest 2 Views    2. SOB (shortness of breath)    - XR Chest 2 Views    3. Rhonchi at both lung bases    - XR Chest 2 Views    Clear xray. Will continue antibiotic. Push fluids. Discussed red flag symptoms that would warrant emergent evaluation.     Patient Instructions   Continue use of albuterol inhaler as needed for wheezing. Continue Amoxicillin per your primary care provider.   No indication today for pneumonia or other lung related infection.       TINY Hernandez [facility]     Roberta is a 57 year old female who presents to clinic today for the following health issues:  Chief Complaint   Patient presents with     Shortness of Breath     Sx 2 weeks ago and horrible sore throat , for about a week - aspirating phygelm.      Migraine     Cough     Per pt Difficult to sleep aspiration while lying down     HPI    Patient with history of breast cancer recently seen through her primary oncologist with a clear work-up.  She also was given amoxicillin 2 days ago states symptoms have not improved.  Complains of cough that is somewhat productive, shortness of breath, wheezing, she is using albuterol inhaler that does not seem to be helping.  She is also using over-the-counter medications.  Symptoms have been going on for about 2 weeks and have been worsening over the past week.  She also was having some migraine not currently.  Denies sinus congestion or sore throat.  Denies nausea vomiting diarrhea.  Denies fever        Review of Systems  Constitutional, HEENT, cardiovascular, pulmonary, gi and gu systems are negative, except as otherwise noted.      Objective    /74   Pulse 75   Temp 98.6  F (37  C) (Tympanic)   Resp 14   Wt 81.2 kg (179 lb)   SpO2 96%   BMI 28.89 kg/m    Physical Exam   GENERAL: alert, no distress and fatigued  EYES: Eyes grossly normal to inspection, PERRL and conjunctivae and sclerae  normal  HENT: normal cephalic/atraumatic, ear canals and TM's normal, nose and mouth without ulcers or lesions, oropharynx clear and oral mucous membranes moist  NECK: bilateral anterior cervical adenopathy, no asymmetry, masses, or scars and thyroid normal to palpation  RESP: Lung sounds scattered rhonchi bilateral posterior wheezing noted.  CV: regular rate and rhythm, normal S1 S2, no S3 or S4, no murmur, click or rub, no peripheral edema and peripheral pulses strong  ABDOMEN: soft, nontender, no hepatosplenomegaly, no masses and bowel sounds normal  MS: no gross musculoskeletal defects noted, no edema  SKIN: no suspicious lesions or rashes      Results for orders placed or performed in visit on 02/13/23   XR Chest 2 Views     Status: None    Narrative    CHEST TWO VIEWS  2/13/2023 4:09 PM     HISTORY:  Shortness of breath. Acute cough.    COMPARISON: None.      Impression    IMPRESSION: The lungs are clear. No pleural effusions. Multiple  surgical clips are noted in the right breast and right axillary  region. The heart size and pulmonary vascularity are within normal  limits.    MONTEZ CONNORS MD         SYSTEM ID:  [id number]

## 2023-02-13 NOTE — TELEPHONE ENCOUNTER
Patient transferred for shortness of breath. Patient speaking in short phrases, states she has someone calling from other office and took that call disconnecting with me.     Patient states she will call back.   Haydee Walker RN   02/13/23 12:24 PM  [facility] Nurse Advisor

## 2023-02-13 NOTE — PATIENT INSTRUCTIONS
Continue use of albuterol inhaler as needed for wheezing. Continue Amoxicillin per your primary care provider.   No indication today for pneumonia or other lung related infection.

## 2023-06-07 ENCOUNTER — OFFICE VISIT (OUTPATIENT)
Dept: URGENT CARE | Facility: URGENT CARE | Age: 58
End: 2023-06-07
Payer: COMMERCIAL

## 2023-06-07 VITALS
RESPIRATION RATE: 16 BRPM | HEART RATE: 83 BPM | SYSTOLIC BLOOD PRESSURE: 105 MMHG | TEMPERATURE: 97.8 F | BODY MASS INDEX: 29.38 KG/M2 | WEIGHT: 182 LBS | OXYGEN SATURATION: 95 % | DIASTOLIC BLOOD PRESSURE: 68 MMHG

## 2023-06-07 DIAGNOSIS — H66.002 ACUTE SUPPURATIVE OTITIS MEDIA OF LEFT EAR WITHOUT SPONTANEOUS RUPTURE OF TYMPANIC MEMBRANE, RECURRENCE NOT SPECIFIED: Primary | ICD-10-CM

## 2023-06-07 PROCEDURE — 99213 OFFICE O/P EST LOW 20 MIN: CPT | Performed by: FAMILY MEDICINE

## 2023-06-07 RX ORDER — AMOXICILLIN 500 MG/1
1000 CAPSULE ORAL 2 TIMES DAILY
Qty: 40 CAPSULE | Refills: 0 | Status: SHIPPED | OUTPATIENT
Start: 2023-06-07 | End: 2023-06-17

## 2023-06-07 NOTE — PROGRESS NOTES
Chief complaint: ear pain    The past 2 months has had ear pain off and on either side  Has had a cold since 3 days ago   And the pain got worse    No fevers  Positive sore throat  No chest pain or shortness of breath   No abdominal pain   No nausea vomiting or diarrhea  Cough productive   Patient did covid test before coming and was negative     Because of persistent and worsening symptoms came in to be seen    Problem list and histories reviewed & adjusted, as indicated.  Additional history: as documented    Problem list, Medication list, Allergies, and Medical/Social/Surgical histories reviewed in [facility] and updated as appropriate.    ROS:  Constitutional, HEENT, cardiovascular, pulmonary, gi and gu systems are negative, except as otherwise noted.    OBJECTIVE:                                                    /68   Pulse 83   Temp 97.8  F (36.6  C) (Tympanic)   Resp 16   Wt 82.6 kg (182 lb)   SpO2 95%   BMI 29.38 kg/m    Body mass index is 29.38 kg/m .  GENERAL: healthy, alert and no distress  EYES: pink palpebral conjunctiva, anicteric sclera, pupils equally reactive to light and accomodation, extraocular muscles intact full and equal.  ENT: midline nasal septum, positive  nasal congestion   Left ear:no tragal tenderness, no mastoid tenderness erythematous, bulging and air fluid level  tympaninc membrane   Right ear: no tragal tenderness, no mastoid tenderness normal tympaninc membrane   NECK: no adenopathy, no asymmetry or  masses  RESP: lungs clear to auscultation - no rales, rhonchi or wheezes  CV: regular rate and rhythm, normal S1 S2, no S3 or S4, no murmur, click or rub, no peripheral edema and peripheral pulses strong  MS: no gross musculoskeletal defects noted, no edema  NEURO: Normal strength and tone, mentation intact and speech normal    Diagnostic Test Results:  No results found for this or any previous visit (from the past 24 hour(s)).     ASSESSMENT/PLAN:                                                         ICD-10-CM    1. Acute suppurative otitis media of left ear without spontaneous rupture of tympanic membrane, recurrence not specified  H66.002 amoxicillin (AMOXIL) 500 MG capsule          Prescribed with amoxicillin   Recommend follow up with primary care provider if no relief in 3 days, sooner if worse  Adverse reactions of medications discussed.  Over the counter medications discussed.   Aware to come back in if with worsening symptoms or if no relief despite treatment plan  Patient voiced understanding and had no further questions.     MD Karla Joyner MD  [facility]

## 2023-09-30 ENCOUNTER — HEALTH MAINTENANCE LETTER (OUTPATIENT)
Age: 58
End: 2023-09-30

## 2023-10-20 ENCOUNTER — OFFICE VISIT (OUTPATIENT)
Dept: URGENT CARE | Facility: URGENT CARE | Age: 58
End: 2023-10-20
Payer: COMMERCIAL

## 2023-10-20 VITALS
DIASTOLIC BLOOD PRESSURE: 68 MMHG | TEMPERATURE: 98 F | RESPIRATION RATE: 16 BRPM | WEIGHT: 182 LBS | SYSTOLIC BLOOD PRESSURE: 109 MMHG | HEART RATE: 76 BPM | BODY MASS INDEX: 29.38 KG/M2 | OXYGEN SATURATION: 97 %

## 2023-10-20 DIAGNOSIS — S20.211A RIB CONTUSION, RIGHT, INITIAL ENCOUNTER: Primary | ICD-10-CM

## 2023-10-20 PROCEDURE — 99213 OFFICE O/P EST LOW 20 MIN: CPT | Performed by: STUDENT IN AN ORGANIZED HEALTH CARE EDUCATION/TRAINING PROGRAM

## 2023-10-20 RX ORDER — BUPROPION HYDROCHLORIDE 300 MG/1
1 TABLET ORAL
COMMUNITY
Start: 2023-07-21

## 2023-10-20 RX ORDER — SERTRALINE HYDROCHLORIDE 100 MG/1
1 TABLET, FILM COATED ORAL
COMMUNITY
Start: 2023-09-22

## 2023-10-20 RX ORDER — IBUPROFEN 200 MG
600 TABLET ORAL ONCE
Status: COMPLETED | OUTPATIENT
Start: 2023-10-20 | End: 2023-10-20

## 2023-10-20 RX ADMIN — Medication 600 MG: at 14:54

## 2023-10-20 ASSESSMENT — PAIN SCALES - GENERAL: PAINLEVEL: SEVERE PAIN (7)
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
## 2023-10-31 ENCOUNTER — ANCILLARY PROCEDURE (OUTPATIENT)
Dept: GENERAL RADIOLOGY | Facility: CLINIC | Age: 58
End: 2023-10-31
Attending: STUDENT IN AN ORGANIZED HEALTH CARE EDUCATION/TRAINING PROGRAM
Payer: COMMERCIAL

## 2023-10-31 ENCOUNTER — OFFICE VISIT (OUTPATIENT)
Dept: URGENT CARE | Facility: URGENT CARE | Age: 58
End: 2023-10-31
Payer: COMMERCIAL

## 2023-10-31 VITALS
OXYGEN SATURATION: 98 % | RESPIRATION RATE: 16 BRPM | HEART RATE: 80 BPM | WEIGHT: 182 LBS | BODY MASS INDEX: 29.38 KG/M2 | DIASTOLIC BLOOD PRESSURE: 72 MMHG | TEMPERATURE: 98.3 F | SYSTOLIC BLOOD PRESSURE: 110 MMHG

## 2023-10-31 DIAGNOSIS — S20.211D CONTUSION OF RIB ON RIGHT SIDE, SUBSEQUENT ENCOUNTER: ICD-10-CM

## 2023-10-31 DIAGNOSIS — S20.211D CONTUSION OF RIB ON RIGHT SIDE, SUBSEQUENT ENCOUNTER: Primary | ICD-10-CM

## 2023-10-31 PROCEDURE — 99213 OFFICE O/P EST LOW 20 MIN: CPT | Performed by: STUDENT IN AN ORGANIZED HEALTH CARE EDUCATION/TRAINING PROGRAM

## 2023-10-31 PROCEDURE — 71101 X-RAY EXAM UNILAT RIBS/CHEST: CPT | Mod: TC | Performed by: RADIOLOGY

## 2023-10-31 NOTE — PROGRESS NOTES
"Assessment & Plan     Contusion of rib on right side, subsequent encounter   Negative for pneumothorax or hemothorax, rib fracture.  No hypoxia.  Discussed symptom management for rib contusion and patient was understanding.  - XR Ribs & Chest Right G/E 3 Views    Return in about 6 weeks (around 12/12/2023) for if symptoms do not improve.    Darlene Leone, DO  she/her  [facility] URGENT [facility]ARE    Subjective     Sharon L Behnke is a 58 year old female who presents to clinic today for the following health issues:    HPI    Per patient states she was seen last week for right side pain, states no imaging was done and pain is getting worst would like x-ray done.       MS Injury/Pain    Onset of symptoms was 7 day(s) ago.  Location: right chest  Context:       The injury happened while at home      Mechanism: fall off bed onto R side      Patient experienced immediate pain  Course of symptoms is same.    Severity moderate  Current and Associated symptoms: Pain, Bruising, Tenderness, and Decreased range of motion  Therapies to improve symptoms include: ibuprofen and Naproxen, ice  This is the first time this type of problem has occurred for this patient.   Bending forward, to the side, getting into the car makes   OOB after [facility]  No cough    Past Medical History:   Diagnosis Date    Breast cancer (H)     Cancer (H)     breast    Diabetes mellitus (H)     History of anesthesia complications     difficulty waking up    PONV (postoperative nausea and vomiting)        Allergies   Allergen Reactions    Morphine And Related      Gets \"wired\"     Current Outpatient Medications   Medication    buPROPion (WELLBUTRIN XL) 300 MG 24 hr tablet    Cholecalciferol (VITAMIN D PO)    CRESTOR 10 MG tablet    Multiple Vitamins-Minerals (HAIR SKIN NAILS PO)    Multiple Vitamins-Minerals (WOMENS MULTI PO)    sertraline (ZOLOFT) 100 MG tablet    TRINTELLIX 10 MG tablet    amoxicillin (AMOXIL) 875 MG tablet    aspirin 81 MG " tablet    CLONIDINE HCL PO    CYCLOBENZAPRINE HCL PO    ibuprofen (ADVIL,MOTRIN) 200 MG tablet    Linaclotide (LINZESS PO)    pregabalin (LYRICA) 75 MG capsule    traZODone (DESYREL) 50 MG tablet    venlafaxine (EFFEXOR-XR) 75 MG 24 hr capsule     No current facility-administered medications for this visit.       Review of Systems  Constitutional, HEENT, cardiovascular, pulmonary, gi and gu systems are negative, except as otherwise noted.      Objective    /72   Pulse 80   Temp 98.3  F (36.8  C) (Tympanic)   Resp 16   Wt 82.6 kg (182 lb)   SpO2 98%   BMI 29.38 kg/m    Physical Exam   GENERAL: healthy, alert and no distress  BREAST: R post-mastectomy scarring, well-healed  MS: Ecchymosis over mid axillary area of right rib 4 through 6 combat tenderness to palpation over mid axillary rib 9 through 10.  No significant redness or swelling    No results found for this or any previous visit (from the past 24 hour(s)).        The use of Dragon[employer] dictation services may have been used to construct the content in this note; any grammatical or spelling errors are non-intentional. Please contact the author of this note directly if you are in need of any clarification.

## 2023-10-31 NOTE — PATIENT INSTRUCTIONS
As discussed, your x-ray was negative for rib fracture.    Rest and protect the injured or sore area. Stop, change, or take a break from any activity that causes pain.    As your pain gets better, slowly return to your normal activities. Be patient. Rib bruises can take weeks or months to heal. If the pain gets worse, it may be a sign that you need to rest a while longer.

## 2024-01-27 ENCOUNTER — OFFICE VISIT (OUTPATIENT)
Dept: URGENT CARE | Facility: URGENT CARE | Age: 59
End: 2024-01-27
Payer: COMMERCIAL

## 2024-01-27 VITALS
WEIGHT: 187 LBS | HEART RATE: 77 BPM | RESPIRATION RATE: 18 BRPM | SYSTOLIC BLOOD PRESSURE: 121 MMHG | BODY MASS INDEX: 30.18 KG/M2 | TEMPERATURE: 97.9 F | OXYGEN SATURATION: 97 % | DIASTOLIC BLOOD PRESSURE: 80 MMHG

## 2024-01-27 DIAGNOSIS — H69.93 DYSFUNCTION OF BOTH EUSTACHIAN TUBES: Primary | ICD-10-CM

## 2024-01-27 PROCEDURE — 99213 OFFICE O/P EST LOW 20 MIN: CPT | Performed by: PHYSICIAN ASSISTANT

## 2024-01-27 NOTE — PROGRESS NOTES
Assessment & Plan     Dysfunction of both eustachian tubes  We discussed symptomatic measures including Flonase, ibuprofen, heat, massage, Sudafed.  If symptoms are persistent, follow-up with ENT for further evaluation.    Return in about 1 week (around 2/3/2024) for visit with primary care provider if not improving.     [facility]    Subjective   Sharon L Behnke is a 58 year old who presents for the following health issues     Patient presents with:  Urgent Care  Ear Problem: Per patient states she has been having bilateral pain for days recently got better from having pneumonia and flu , states right ear is worst than left one      HPI    Roberta presents clinic today for evaluation of bilateral ear pain.  She states that she had pneumonia at the end of December and was treated with azithromycin.  She still has a little bit of postnasal drainage and her throat is a little sore.  She has had bilateral ear pain that has not cleared.    Review of Systems   ROS negative except as stated above.      Objective    /80   Pulse 77   Temp 97.9  F (36.6  C) (Tympanic)   Resp 18   Wt 84.8 kg (187 lb)   SpO2 97%   BMI 30.18 kg/m    Physical Exam   GENERAL: alert and no distress  EYES: Eyes grossly normal to inspection, PERRL and conjunctivae and sclerae normal  HENT: ear canals normal, bilateral TMs are full appearing with a good cone of light, gray, no erythema.  Questionable serous effusions bilaterally.  Nose and mouth without ulcers or lesions  NECK: no adenopathy, no asymmetry, masses, or scars  RESP: lungs clear to auscultation - no rales, rhonchi or wheezes  CV: regular rate and rhythm, normal S1 S2, no S3 or S4, no murmur, click or rub, no peripheral edema    No results found for any visits on 01/27/24.

## 2024-01-27 NOTE — PATIENT INSTRUCTIONS
Use Flonase (fluticasone) as directed- 2 sprays in each nostril daily until symptoms resolve then continue with 1 spray in each nostril daily for 5 more days.  Ibuprofen as needed for pain relief and to reduce inflammation.  Warm compresses next to ear for pain relief.  Massage behind the ear to encourage ear to drain.  Drink plenty of fluids and place a humidifier in bedroom.  Sudafed (pseudoephedrine) behind the pharmacist counter helps to dry up fluid in ears.  Follow up with primary care provider in 10-14 days with any concern of persistent pain.

## 2024-02-17 ENCOUNTER — HEALTH MAINTENANCE LETTER (OUTPATIENT)
Age: 59
End: 2024-02-17

## 2024-06-18 ENCOUNTER — OFFICE VISIT (OUTPATIENT)
Dept: URGENT CARE | Facility: URGENT CARE | Age: 59
End: 2024-06-18
Payer: COMMERCIAL

## 2024-06-18 VITALS
BODY MASS INDEX: 30.02 KG/M2 | RESPIRATION RATE: 18 BRPM | TEMPERATURE: 97.9 F | WEIGHT: 186 LBS | SYSTOLIC BLOOD PRESSURE: 136 MMHG | HEART RATE: 74 BPM | DIASTOLIC BLOOD PRESSURE: 83 MMHG | OXYGEN SATURATION: 98 %
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
## 2024-06-18 DIAGNOSIS — S40.861A TICK BITE OF RIGHT UPPER ARM, INITIAL ENCOUNTER: Primary | ICD-10-CM

## 2024-06-18 PROCEDURE — 99213 OFFICE O/P EST LOW 20 MIN: CPT | Performed by: NURSE PRACTITIONER

## 2024-06-18 RX ORDER — DOXYCYCLINE HYCLATE 100 MG
100 TABLET ORAL 2 TIMES DAILY
Qty: 20 TABLET | Refills: 0 | Status: SHIPPED | OUTPATIENT
Start: 2024-06-18 | End: 2024-06-28

## 2024-06-18 NOTE — PROGRESS NOTES
Assessment & Plan   1. Tick bite of right upper arm, initial encounter  Will treat with oral doxycycline for erythemic appearing area after pulling tick off her right axilla.  Discussed side effects this medication discussed red flag symptoms that would warrant emergent or urgent evaluation patient verbalized understanding was agreement with plan.  - doxycycline hyclate (VIBRA-TABS) 100 MG tablet; Take 1 tablet (100 mg) by mouth 2 times daily for 10 days  Dispense: 20 tablet; Refill: 0    TINY Hernandez [facility] [place]    Subjective     Roberta is a 59 year old female who presents to clinic today for the following health issues:  Chief Complaint   Patient presents with    Urgent Care    Insect Bites     Per patient states she found a tick under her right arm she was able to get out but feels its infected.      HPI    Patient presents to clinic states she removed a tick from her right axillary region and states she does have a history of breast cancer and right axilla lumpectomy she does not have a lot of feeling in this area.  Area appears erythemic's with slight swelling denies fever or malaise.    Review of Systems  Constitutional, HEENT, cardiovascular, pulmonary, gi and gu systems are negative, except as otherwise noted.      Objective    /83   Pulse 74   Temp 97.9  F (36.6  C) (Tympanic)   Resp 18   Wt 84.4 kg (186 lb)   SpO2 98%   BMI 30.02 kg/m    Physical Exam   GENERAL: alert and no distress  EYES: Eyes grossly normal to inspection, PERRL and conjunctivae and sclerae normal  HENT: ear canals and TM's normal, nose and mouth without ulcers or lesions  NECK: no adenopathy, no asymmetry, masses, or scars  RESP: lungs clear to auscultation - no rales, rhonchi or wheezes  CV: regular rate and rhythm, normal S1 S2, no S3 or S4, no murmur, click or rub, no peripheral edema  ABDOMEN: soft, nontender, no hepatosplenomegaly, no masses and bowel sounds normal  MS: no gross  musculoskeletal defects noted, no edema  SKIN: Right axilla mild erythema area approximately 1 inch in diameter negative for fluctuance or drainage.

## 2024-11-17 ENCOUNTER — HEALTH MAINTENANCE LETTER (OUTPATIENT)
Age: 59
End: 2024-11-17

## 2025-03-08 ENCOUNTER — HEALTH MAINTENANCE LETTER (OUTPATIENT)
Age: 60
End: 2025-03-08